# Patient Record
Sex: MALE | Race: OTHER | HISPANIC OR LATINO | URBAN - METROPOLITAN AREA
[De-identification: names, ages, dates, MRNs, and addresses within clinical notes are randomized per-mention and may not be internally consistent; named-entity substitution may affect disease eponyms.]

---

## 2017-11-14 ENCOUNTER — OUTPATIENT (OUTPATIENT)
Dept: OUTPATIENT SERVICES | Facility: HOSPITAL | Age: 47
LOS: 1 days | End: 2017-11-14
Payer: COMMERCIAL

## 2017-11-14 VITALS
SYSTOLIC BLOOD PRESSURE: 130 MMHG | WEIGHT: 179.9 LBS | HEART RATE: 62 BPM | HEIGHT: 66 IN | TEMPERATURE: 98 F | OXYGEN SATURATION: 98 % | DIASTOLIC BLOOD PRESSURE: 70 MMHG | RESPIRATION RATE: 18 BRPM

## 2017-11-14 DIAGNOSIS — Z01.818 ENCOUNTER FOR OTHER PREPROCEDURAL EXAMINATION: ICD-10-CM

## 2017-11-14 DIAGNOSIS — M75.100 UNSPECIFIED ROTATOR CUFF TEAR OR RUPTURE OF UNSPECIFIED SHOULDER, NOT SPECIFIED AS TRAUMATIC: ICD-10-CM

## 2017-11-14 PROCEDURE — G0463: CPT

## 2017-11-14 NOTE — H&P PST ADULT - NSANTHOSAYNRD_GEN_A_CORE
No. DAPHNE screening performed.  STOP BANG Legend: 0-2 = LOW Risk; 3-4 = INTERMEDIATE Risk; 5-8 = HIGH Risk
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
## 2017-11-20 NOTE — ASU DISCHARGE PLAN (ADULT/PEDIATRIC). - DRIVING
do not drive while taking pain medication Yes/do not drive while taking pain medication No/until seen by surgeon.

## 2017-11-20 NOTE — ASU DISCHARGE PLAN (ADULT/PEDIATRIC). - MEDICATION SUMMARY - MEDICATIONS TO TAKE
I will START or STAY ON the medications listed below when I get home from the hospital:    oxyCODONE-acetaminophen 5 mg-325 mg oral tablet  -- 1 tab(s) by mouth every 4 hours, As Needed MDD:6   -- Caution federal law prohibits the transfer of this drug to any person other  than the person for whom it was prescribed.  May cause drowsiness.  Alcohol may intensify this effect.  Use care when operating dangerous machinery.  This prescription cannot be refilled.  This product contains acetaminophen.  Do not use  with any other product containing acetaminophen to prevent possible liver damage.  Using more of this medication than prescribed may cause serious breathing problems.    -- Indication: For Pain    diclofenac sodium 50 mg oral delayed release tablet  -- 1 tab(s) by mouth 3 times a day, As Needed  -- Indication: For Pain

## 2017-11-20 NOTE — ASU DISCHARGE PLAN (ADULT/PEDIATRIC). - SPECIAL INSTRUCTIONS
icemeds  Take an over the counter stool softener like Colace to avoid constipation while taking a narcotic for pain ice packs to surgical site 20 minutes on and 20 minutes off while awake for next 24 hours   pain medication as instructed by M.D.  Take an over the counter stool softener like Colace to avoid constipation while taking a narcotic for pain

## 2017-11-20 NOTE — ASU DISCHARGE PLAN (ADULT/PEDIATRIC). - DRESSING FT
remove dressing in 4 days, clean with alcohol and apply band aids to surgical sites Remove bandages in 3 days, wipe with alcohol and apply band-aids to incision sites.

## 2017-11-20 NOTE — ASU DISCHARGE PLAN (ADULT/PEDIATRIC). - ACTIVITY LEVEL
elevate extremity/no heavy lifting/no sports/gym Wear RIGHT arm in sling./elevate extremity/no sports/gym/no heavy lifting

## 2017-11-20 NOTE — ASU DISCHARGE PLAN (ADULT/PEDIATRIC). - FOLLOWUP APPOINTMENT CLINIC/PHYSICIAN
7-10 days  call office for a follow up appointment Please call Dr. TERA Horton's office ([phone]) to schedule a follow-up appointment to be seen in 1 week.

## 2017-12-01 ENCOUNTER — OUTPATIENT (OUTPATIENT)
Dept: OUTPATIENT SERVICES | Facility: HOSPITAL | Age: 47
LOS: 1 days | End: 2017-12-01
Payer: COMMERCIAL

## 2017-12-01 VITALS
RESPIRATION RATE: 16 BRPM | HEART RATE: 72 BPM | DIASTOLIC BLOOD PRESSURE: 70 MMHG | OXYGEN SATURATION: 96 % | SYSTOLIC BLOOD PRESSURE: 115 MMHG

## 2017-12-01 VITALS
HEART RATE: 66 BPM | OXYGEN SATURATION: 99 % | DIASTOLIC BLOOD PRESSURE: 74 MMHG | WEIGHT: 181.22 LBS | SYSTOLIC BLOOD PRESSURE: 118 MMHG | RESPIRATION RATE: 14 BRPM | TEMPERATURE: 98 F

## 2017-12-01 DIAGNOSIS — Z01.818 ENCOUNTER FOR OTHER PREPROCEDURAL EXAMINATION: ICD-10-CM

## 2017-12-01 DIAGNOSIS — M75.100 UNSPECIFIED ROTATOR CUFF TEAR OR RUPTURE OF UNSPECIFIED SHOULDER, NOT SPECIFIED AS TRAUMATIC: ICD-10-CM

## 2017-12-01 PROCEDURE — 88304 TISSUE EXAM BY PATHOLOGIST: CPT

## 2017-12-01 PROCEDURE — 29822 SHO ARTHRS SRG LMTD DBRDMT: CPT | Mod: RT

## 2017-12-01 PROCEDURE — 88304 TISSUE EXAM BY PATHOLOGIST: CPT | Mod: 26

## 2017-12-01 RX ORDER — SODIUM CHLORIDE 9 MG/ML
1000 INJECTION, SOLUTION INTRAVENOUS
Qty: 0 | Refills: 0 | Status: DISCONTINUED | OUTPATIENT
Start: 2017-12-01 | End: 2017-12-05

## 2017-12-01 RX ORDER — OXYCODONE AND ACETAMINOPHEN 5; 325 MG/1; MG/1
2 TABLET ORAL EVERY 6 HOURS
Qty: 0 | Refills: 0 | Status: DISCONTINUED | OUTPATIENT
Start: 2017-12-01 | End: 2017-12-05

## 2017-12-01 RX ORDER — CEFAZOLIN SODIUM 1 G
2000 VIAL (EA) INJECTION ONCE
Qty: 0 | Refills: 0 | Status: COMPLETED | OUTPATIENT
Start: 2017-12-01 | End: 2017-12-01

## 2017-12-01 RX ORDER — DICLOFENAC SODIUM 75 MG/1
1 TABLET, DELAYED RELEASE ORAL
Qty: 0 | Refills: 0 | COMMUNITY

## 2017-12-01 RX ORDER — HYDROMORPHONE HYDROCHLORIDE 2 MG/ML
0.5 INJECTION INTRAMUSCULAR; INTRAVENOUS; SUBCUTANEOUS
Qty: 0 | Refills: 0 | Status: DISCONTINUED | OUTPATIENT
Start: 2017-12-01 | End: 2017-12-05

## 2017-12-01 NOTE — BRIEF OPERATIVE NOTE - PROCEDURE
<<-----Click on this checkbox to enter Procedure Debridement, shoulder, arthroscopic  12/01/2017  labrum  Active  NEGRITA  Manipulation under anesthesia  12/01/2017    Active  NEGRITA  Shoulder arthroscopy  12/01/2017  RIGHT  Active  NEGRITA  Lysis of adhesions of shoulder joint  12/01/2017  RIGHT, subacromial  Active  NEGRITA

## 2017-12-01 NOTE — BRIEF OPERATIVE NOTE - POST-OP DX
Adhesions of shoulder joint, right  12/01/2017    Active  Melanie Talavera  Glenoid labrum tear  12/01/2017  partial, FLAP  Active  Melanie Talavera

## 2017-12-01 NOTE — BRIEF OPERATIVE NOTE - PRE-OP DX
Right shoulder pain  12/01/2017    Active  Melanie Talavera  Rotator cuff tear, right  12/01/2017  partial  Active  Melanie Talavera

## 2020-11-12 NOTE — H&P PST ADULT - NSANTHTOTALSCORECAL_ENT_A_CORE
Take medication as directed.  Intermittent ice to the affected area.  Return if any changes or concerns.  
1

## 2021-03-18 NOTE — H&P PST ADULT - PAIN, FACTORS THAT RELIEVE, PROFILE
CM f/u with [facility] regarding rehab choices  [facility] unable to accept-no beds  CM states pt will have a copay of $176/day, secondary did not cover cost   Ofelia Goldstein requested referral to -   CM placed referral in [facility] per request  medications